# Patient Record
Sex: FEMALE | Race: ASIAN | NOT HISPANIC OR LATINO | Employment: FULL TIME | URBAN - METROPOLITAN AREA
[De-identification: names, ages, dates, MRNs, and addresses within clinical notes are randomized per-mention and may not be internally consistent; named-entity substitution may affect disease eponyms.]

---

## 2017-04-25 ENCOUNTER — OFFICE VISIT (OUTPATIENT)
Dept: OPTOMETRY | Facility: CLINIC | Age: 42
End: 2017-04-25
Payer: COMMERCIAL

## 2017-04-25 DIAGNOSIS — H43.392 VITREOUS FLOATERS OF LEFT EYE: ICD-10-CM

## 2017-04-25 DIAGNOSIS — H44.23 MYOPIC DEGENERATION, BILATERAL: Primary | ICD-10-CM

## 2017-04-25 DIAGNOSIS — H52.13 MYOPIA, BILATERAL: ICD-10-CM

## 2017-04-25 PROCEDURE — 92012 INTRM OPH EXAM EST PATIENT: CPT | Mod: S$GLB,,, | Performed by: OPTOMETRIST

## 2017-04-25 PROCEDURE — 99999 PR PBB SHADOW E&M-EST. PATIENT-LVL II: CPT | Mod: PBBFAC,,, | Performed by: OPTOMETRIST

## 2017-04-25 PROCEDURE — 92310 CONTACT LENS FITTING OU: CPT | Mod: ,,, | Performed by: OPTOMETRIST

## 2017-04-25 NOTE — PROGRESS NOTES
HPI     Needs new contacts and glasses  Left lens was uncomfortable , switched to old left , a little better  Followed in retina by Xin for PVD.        Last edited by Diogenes Baires, OD on 4/25/2017 11:11 AM.     ROS     Negative for: Constitutional, Gastrointestinal, Neurological, Skin,   Genitourinary, Musculoskeletal, HENT, Endocrine, Cardiovascular, Eyes,   Respiratory, Psychiatric, Allergic/Imm, Heme/Lymph    Last edited by Diogenes Baires, OD on 4/25/2017 10:51 AM. (History)        Assessment /Plan     For exam results, see Encounter Report.    Myopic degeneration, bilateral    Myopia, bilateral    Vitreous floaters of left eye      1,2. Spec Rx given and Contact lens Rx released to pt. Daily wear only advised, with education to risks of extended wear.  Discussed lens care, compliance and solutions. RTC yearly contact lens follow up.   . Different lens options discussed with patient. RTC 1 year full exam.  3. Keep appts with retina for follow up on floaters and retina condition.

## 2018-04-26 ENCOUNTER — OFFICE VISIT (OUTPATIENT)
Dept: OPTOMETRY | Facility: CLINIC | Age: 43
End: 2018-04-26
Payer: COMMERCIAL

## 2018-04-26 DIAGNOSIS — H44.23 MYOPIC DEGENERATION, BILATERAL: ICD-10-CM

## 2018-04-26 DIAGNOSIS — H04.123 BILATERAL DRY EYES: Primary | ICD-10-CM

## 2018-04-26 DIAGNOSIS — Z46.0 FITTING AND ADJUSTMENT OF SPECTACLES AND CONTACT LENSES: Primary | ICD-10-CM

## 2018-04-26 DIAGNOSIS — H52.13 MYOPIA, BILATERAL: ICD-10-CM

## 2018-04-26 PROCEDURE — 92012 INTRM OPH EXAM EST PATIENT: CPT | Mod: S$GLB,,, | Performed by: OPTOMETRIST

## 2018-04-26 PROCEDURE — 92310 CONTACT LENS FITTING OU: CPT | Mod: ,,, | Performed by: OPTOMETRIST

## 2018-04-26 PROCEDURE — 92015 DETERMINE REFRACTIVE STATE: CPT | Mod: S$GLB,,, | Performed by: OPTOMETRIST

## 2018-04-26 PROCEDURE — 99999 PR PBB SHADOW E&M-EST. PATIENT-LVL II: CPT | Mod: PBBFAC,,, | Performed by: OPTOMETRIST

## 2018-04-26 NOTE — PROGRESS NOTES
HPI     Increased dryness Ou  Not using drops  No new flashes or floaters    Last edited by Diogenes Baires, OD on 4/26/2018  2:23 PM. (History)            Assessment /Plan     For exam results, see Encounter Report.    Bilateral dry eyes    Myopic degeneration, bilateral    Myopia, bilateral      1. Recommend artificial tears. 1 drop 3-4x per day. Chronicity of disease and treatment discussed.  2,3. Spec Rx given and Contact lens Rx released to pt. Daily wear only advised, with education to risks of extended wear.  Discussed lens care, compliance and solutions. RTC yearly contact lens follow up.   . Different lens options discussed with patient. RTC 1 year full exam.

## 2018-06-05 ENCOUNTER — HOSPITAL ENCOUNTER (OUTPATIENT)
Dept: RADIOLOGY | Facility: OTHER | Age: 43
Discharge: HOME | End: 2018-06-05
Attending: OBSTETRICS & GYNECOLOGY
Payer: COMMERCIAL

## 2018-06-05 VITALS — HEIGHT: 60 IN | WEIGHT: 118 LBS | BODY MASS INDEX: 23.16 KG/M2

## 2018-06-05 DIAGNOSIS — Z12.31 VISIT FOR SCREENING MAMMOGRAM: ICD-10-CM

## 2018-06-05 PROCEDURE — 77063 BREAST TOMOSYNTHESIS BI: CPT | Mod: 26,,, | Performed by: RADIOLOGY

## 2018-06-05 PROCEDURE — 77067 SCR MAMMO BI INCL CAD: CPT | Mod: 26,,, | Performed by: RADIOLOGY

## 2018-06-05 PROCEDURE — 77067 SCR MAMMO BI INCL CAD: CPT | Mod: TC

## 2018-06-07 ENCOUNTER — OFFICE VISIT (OUTPATIENT)
Dept: OPHTHALMOLOGY | Facility: CLINIC | Age: 43
End: 2018-06-07
Payer: COMMERCIAL

## 2018-06-07 ENCOUNTER — HOSPITAL ENCOUNTER (OUTPATIENT)
Dept: RADIOLOGY | Facility: OTHER | Age: 43
Discharge: HOME | End: 2018-06-07
Attending: OBSTETRICS & GYNECOLOGY
Payer: COMMERCIAL

## 2018-06-07 DIAGNOSIS — H44.22: Primary | ICD-10-CM

## 2018-06-07 DIAGNOSIS — R92.8 ABNORMAL MAMMOGRAM: ICD-10-CM

## 2018-06-07 DIAGNOSIS — H35.62 RETINAL HEMORRHAGE, LEFT EYE: ICD-10-CM

## 2018-06-07 PROCEDURE — 77061 BREAST TOMOSYNTHESIS UNI: CPT | Mod: 26,LT,, | Performed by: RADIOLOGY

## 2018-06-07 PROCEDURE — 92014 COMPRE OPH EXAM EST PT 1/>: CPT | Mod: S$GLB,,, | Performed by: OPHTHALMOLOGY

## 2018-06-07 PROCEDURE — 76642 ULTRASOUND BREAST LIMITED: CPT | Mod: 26,LT,, | Performed by: RADIOLOGY

## 2018-06-07 PROCEDURE — 77065 DX MAMMO INCL CAD UNI: CPT | Mod: TC,LT

## 2018-06-07 PROCEDURE — 77065 DX MAMMO INCL CAD UNI: CPT | Mod: 26,LT,, | Performed by: RADIOLOGY

## 2018-06-07 PROCEDURE — 77061 BREAST TOMOSYNTHESIS UNI: CPT | Mod: TC,LT

## 2018-06-07 PROCEDURE — 92226 PR SPECIAL EYE EXAM, SUBSEQUENT: CPT | Mod: LT,S$GLB,, | Performed by: OPHTHALMOLOGY

## 2018-06-07 PROCEDURE — 99999 PR PBB SHADOW E&M-EST. PATIENT-LVL II: CPT | Mod: PBBFAC,,, | Performed by: OPHTHALMOLOGY

## 2018-06-07 PROCEDURE — 76642 ULTRASOUND BREAST LIMITED: CPT | Mod: TC,LT

## 2018-06-07 NOTE — PROGRESS NOTES
HPI     DLS: 3/7/16  Pt states everything seems to be good. Just updated her glasses and contacts. Only problem she still see a small grey spot out the left eye. Does not impact her vision. No flashes no floaters. No blurred   vision.     OCT from [facility] - small PPheme      A/P    1. Myopic Degeneration   S/p Avastin OS x 1 in 2011    PPheme - small, not foveal threatening      RD precautions      2 yr

## 2018-11-12 ENCOUNTER — LAB VISIT (OUTPATIENT)
Dept: LAB | Facility: HOSPITAL | Age: 43
End: 2018-11-12
Attending: INTERNAL MEDICINE
Payer: COMMERCIAL

## 2018-11-12 ENCOUNTER — OFFICE VISIT (OUTPATIENT)
Dept: INTERNAL MEDICINE | Facility: CLINIC | Age: 43
End: 2018-11-12
Payer: COMMERCIAL

## 2018-11-12 VITALS
WEIGHT: 123.88 LBS | TEMPERATURE: 98 F | DIASTOLIC BLOOD PRESSURE: 60 MMHG | BODY MASS INDEX: 24.32 KG/M2 | HEART RATE: 63 BPM | HEIGHT: 60 IN | SYSTOLIC BLOOD PRESSURE: 101 MMHG | OXYGEN SATURATION: 99 % | RESPIRATION RATE: 18 BRPM

## 2018-11-12 DIAGNOSIS — Z76.89 ENCOUNTER TO ESTABLISH CARE: ICD-10-CM

## 2018-11-12 DIAGNOSIS — Z00.00 ANNUAL PHYSICAL EXAM: Primary | ICD-10-CM

## 2018-11-12 DIAGNOSIS — D17.9 MULTIPLE LIPOMAS: ICD-10-CM

## 2018-11-12 DIAGNOSIS — Z00.00 ANNUAL PHYSICAL EXAM: ICD-10-CM

## 2018-11-12 LAB
ALBUMIN SERPL BCP-MCNC: 3.9 G/DL
ALP SERPL-CCNC: 27 U/L
ALT SERPL W/O P-5'-P-CCNC: 13 U/L
ANION GAP SERPL CALC-SCNC: 10 MMOL/L
AST SERPL-CCNC: 24 U/L
BASOPHILS # BLD AUTO: 0.03 K/UL
BASOPHILS NFR BLD: 0.6 %
BILIRUB SERPL-MCNC: 0.4 MG/DL
BUN SERPL-MCNC: 19 MG/DL
CALCIUM SERPL-MCNC: 9.6 MG/DL
CHLORIDE SERPL-SCNC: 105 MMOL/L
CHOLEST SERPL-MCNC: 194 MG/DL
CHOLEST/HDLC SERPL: 2.9 {RATIO}
CO2 SERPL-SCNC: 23 MMOL/L
CREAT SERPL-MCNC: 0.9 MG/DL
DIFFERENTIAL METHOD: ABNORMAL
EOSINOPHIL # BLD AUTO: 0.2 K/UL
EOSINOPHIL NFR BLD: 3.6 %
ERYTHROCYTE [DISTWIDTH] IN BLOOD BY AUTOMATED COUNT: 12.2 %
EST. GFR  (AFRICAN AMERICAN): >60 ML/MIN/1.73 M^2
EST. GFR  (NON AFRICAN AMERICAN): >60 ML/MIN/1.73 M^2
ESTIMATED AVG GLUCOSE: 97 MG/DL
GLUCOSE SERPL-MCNC: 99 MG/DL
HBA1C MFR BLD HPLC: 5 %
HCT VFR BLD AUTO: 39.8 %
HDLC SERPL-MCNC: 68 MG/DL
HDLC SERPL: 35.1 %
HGB BLD-MCNC: 13.6 G/DL
IMM GRANULOCYTES # BLD AUTO: 0.02 K/UL
IMM GRANULOCYTES NFR BLD AUTO: 0.4 %
LDLC SERPL CALC-MCNC: 77.4 MG/DL
LYMPHOCYTES # BLD AUTO: 1.9 K/UL
LYMPHOCYTES NFR BLD: 36.1 %
MCH RBC QN AUTO: 32.4 PG
MCHC RBC AUTO-ENTMCNC: 34.2 G/DL
MCV RBC AUTO: 95 FL
MONOCYTES # BLD AUTO: 0.3 K/UL
MONOCYTES NFR BLD: 5.2 %
NEUTROPHILS # BLD AUTO: 2.9 K/UL
NEUTROPHILS NFR BLD: 54.1 %
NONHDLC SERPL-MCNC: 126 MG/DL
NRBC BLD-RTO: 0 /100 WBC
PLATELET # BLD AUTO: 253 K/UL
PMV BLD AUTO: 9.6 FL
POTASSIUM SERPL-SCNC: 3.6 MMOL/L
PROT SERPL-MCNC: 7.7 G/DL
RBC # BLD AUTO: 4.2 M/UL
SODIUM SERPL-SCNC: 138 MMOL/L
TRIGL SERPL-MCNC: 243 MG/DL
TSH SERPL DL<=0.005 MIU/L-ACNC: 1.63 UIU/ML
WBC # BLD AUTO: 5.35 K/UL

## 2018-11-12 PROCEDURE — 99386 PREV VISIT NEW AGE 40-64: CPT | Mod: S$GLB,,, | Performed by: INTERNAL MEDICINE

## 2018-11-12 PROCEDURE — 80053 COMPREHEN METABOLIC PANEL: CPT

## 2018-11-12 PROCEDURE — 99999 PR PBB SHADOW E&M-EST. PATIENT-LVL III: CPT | Mod: PBBFAC,,, | Performed by: INTERNAL MEDICINE

## 2018-11-12 PROCEDURE — 36415 COLL VENOUS BLD VENIPUNCTURE: CPT | Mod: PO

## 2018-11-12 PROCEDURE — 83036 HEMOGLOBIN GLYCOSYLATED A1C: CPT

## 2018-11-12 PROCEDURE — 80061 LIPID PANEL: CPT

## 2018-11-12 PROCEDURE — 84443 ASSAY THYROID STIM HORMONE: CPT

## 2018-11-12 PROCEDURE — 85025 COMPLETE CBC W/AUTO DIFF WBC: CPT

## 2018-11-12 RX ORDER — SULFAMETHOXAZOLE AND TRIMETHOPRIM 800; 160 MG/1; MG/1
TABLET ORAL
COMMUNITY
Start: 2018-11-09 | End: 2021-07-27

## 2018-11-12 RX ORDER — DESOGESTREL AND ETHINYL ESTRADIOL 0.15-0.03
KIT ORAL
COMMUNITY
Start: 2018-09-25 | End: 2021-07-27

## 2018-11-12 NOTE — PROGRESS NOTES
Subjective:       Patient ID: Jennifer Rivera is a 43 y.o. female.    Chief Complaint: Annual Exam    HPI    43 y.o. female here for annual exam.     Health Maintenance:   Lipid disorders/ASCVD risk: due    DM: A1c due  Eye exam: utd- Dr. Baires  Breast Cancer (40-74y): Mammogram 18   Cervical Cancer (21-65y):  05/15/2018-normal. Gyn Dr. Coty Markham      Vaccines:   Influenza (yearly) 10/16/18   Tetanus (every 10 yrs - 1st tdap) ~7 years ago - tdap w/ pregnancy           Medical Problems:  1. lipoma: no pain or rapid growth, she just wanted a second opinion.        Past Medical History:   Diagnosis Date    Retina disorder     h/o ret disorder OD. S/p Avastin     Past Surgical History:   Procedure Laterality Date     SECTION      x 2    eye injection      s/p Avastin     Family History   Problem Relation Age of Onset    Osteoporosis Mother         no hist of hip fracture    Hypertension Father     Hyperlipidemia Father     Gallbladder disease Father     Gout Father     No Known Problems Brother     Glaucoma Neg Hx     Amblyopia Neg Hx     Blindness Neg Hx     Cancer Neg Hx     Cataracts Neg Hx     Diabetes Neg Hx     Macular degeneration Neg Hx     Retinal detachment Neg Hx     Strabismus Neg Hx     Stroke Neg Hx     Thyroid disease Neg Hx      Social History     Socioeconomic History    Marital status:      Spouse name: Not on file    Number of children: Not on file    Years of education: Not on file    Highest education level: Not on file   Social Needs    Financial resource strain: Not on file    Food insecurity - worry: Not on file    Food insecurity - inability: Not on file    Transportation needs - medical: Not on file    Transportation needs - non-medical: Not on file   Occupational History    Not on file   Tobacco Use    Smoking status: Never Smoker   Substance and Sexual Activity    Alcohol use: No    Drug use: Not on file    Sexual activity: Not on file    Other Topics Concern    Not on file   Social History Narrative    Not on file     Review of patient's allergies indicates:  No Known Allergies    Current Outpatient Medications:     ENSKYCE 0.15-0.03 mg per tablet, , Disp: , Rfl:     sulfamethoxazole-trimethoprim 800-160mg (BACTRIM DS) 800-160 mg Tab, , Disp: , Rfl:       Review of Systems   Constitutional: Negative for activity change and unexpected weight change.   HENT: Negative for hearing loss, rhinorrhea and trouble swallowing.    Eyes: Negative for discharge and visual disturbance.   Respiratory: Negative for chest tightness and wheezing.    Cardiovascular: Negative for chest pain and palpitations.   Gastrointestinal: Negative for blood in stool, constipation, diarrhea and vomiting.   Endocrine: Negative for polydipsia and polyuria.   Genitourinary: Negative for difficulty urinating, dysuria, hematuria and menstrual problem.   Musculoskeletal: Negative for arthralgias, joint swelling and neck pain.   Neurological: Negative for weakness and headaches.   Psychiatric/Behavioral: Negative for confusion and dysphoric mood.       Objective:        Vitals:    11/12/18 1252   BP: 101/60   Pulse: 63   Resp: 18   Temp: 97.6 °F (36.4 °C)   SpO2: 99%   Weight: 56.2 kg (123 lb 14.4 oz)   Height: 5' (1.524 m)       Body mass index is 24.2 kg/m².    Physical Exam   Constitutional: She is oriented to person, place, and time. She appears well-developed. No distress.   HENT:   Head: Normocephalic and atraumatic.   Right Ear: Tympanic membrane normal.   Left Ear: Tympanic membrane normal.   Nose: Nose normal.   Mouth/Throat: Oropharynx is clear and moist.   Eyes: Conjunctivae and EOM are normal.   Neck: Normal range of motion. Neck supple. No tracheal deviation present. No thyromegaly present.   Cardiovascular: Normal rate, regular rhythm, normal heart sounds and intact distal pulses.   Pulmonary/Chest: Effort normal and breath sounds normal.   Abdominal: Soft. Bowel  sounds are normal. She exhibits no distension and no mass. There is no tenderness.   Musculoskeletal: Normal range of motion. She exhibits no edema.   Lymphadenopathy:     She has no cervical adenopathy.   Neurological: She is alert and oriented to person, place, and time. No sensory deficit.   Skin: Skin is warm and dry. She is not diaphoretic. No cyanosis. Nails show no clubbing.   + several lipomas   Psychiatric: She has a normal mood and affect. Her behavior is normal. Judgment normal.       Assessment:     1. Annual physical exam    2. Encounter to establish care    3. Multiple lipomas           Plan:         1. Annual physical exam  - flu shot utd  - records requested from her OBGYN for pap smear and tdap dates  - CBC auto differential; Future  - Comprehensive metabolic panel; Future  - Hemoglobin A1c; Future  - Lipid panel; Future  - TSH; Future    2. Encounter to establish care  - reviewed healthcare maintenance and pt's chronic medical problems.     3. Multiple lipomas  - she reports stability in size. Continue to monitor.            Patient note was created using MModal Dictation.  Any errors in syntax or even information may not have been identified and edited on initial review prior to signing this note.

## 2018-11-13 ENCOUNTER — PATIENT MESSAGE (OUTPATIENT)
Dept: INTERNAL MEDICINE | Facility: CLINIC | Age: 43
End: 2018-11-13

## 2018-11-28 ENCOUNTER — TELEPHONE (OUTPATIENT)
Dept: ADMINISTRATIVE | Facility: HOSPITAL | Age: 43
End: 2018-11-28

## 2019-05-28 DIAGNOSIS — Z12.39 SCREENING BREAST EXAMINATION: Primary | ICD-10-CM

## 2019-06-10 ENCOUNTER — HOSPITAL ENCOUNTER (OUTPATIENT)
Dept: RADIOLOGY | Facility: OTHER | Age: 44
Discharge: HOME | End: 2019-06-10
Attending: OBSTETRICS & GYNECOLOGY
Payer: COMMERCIAL

## 2019-06-10 DIAGNOSIS — Z12.39 SCREENING BREAST EXAMINATION: ICD-10-CM

## 2019-06-10 PROCEDURE — 77067 SCR MAMMO BI INCL CAD: CPT | Mod: TC

## 2019-06-10 PROCEDURE — 77063 MAMMO DIGITAL SCREENING BILAT WITH TOMOSYNTHESIS_CAD: ICD-10-PCS | Mod: 26,,, | Performed by: RADIOLOGY

## 2019-06-10 PROCEDURE — 77067 MAMMO DIGITAL SCREENING BILAT WITH TOMOSYNTHESIS_CAD: ICD-10-PCS | Mod: 26,,, | Performed by: RADIOLOGY

## 2019-06-10 PROCEDURE — 77067 SCR MAMMO BI INCL CAD: CPT | Mod: 26,,, | Performed by: RADIOLOGY

## 2019-06-10 PROCEDURE — 77063 BREAST TOMOSYNTHESIS BI: CPT | Mod: 26,,, | Performed by: RADIOLOGY

## 2019-08-08 ENCOUNTER — PATIENT MESSAGE (OUTPATIENT)
Dept: OPTOMETRY | Facility: CLINIC | Age: 44
End: 2019-08-08

## 2019-08-20 ENCOUNTER — OFFICE VISIT (OUTPATIENT)
Dept: OPTOMETRY | Facility: CLINIC | Age: 44
End: 2019-08-20
Payer: COMMERCIAL

## 2019-08-20 DIAGNOSIS — Z46.0 FITTING AND ADJUSTMENT OF SPECTACLES AND CONTACT LENSES: Primary | ICD-10-CM

## 2019-08-20 DIAGNOSIS — H52.13 MYOPIA, BILATERAL: ICD-10-CM

## 2019-08-20 DIAGNOSIS — H44.23 MYOPIC DEGENERATION, BILATERAL: Primary | ICD-10-CM

## 2019-08-20 PROCEDURE — 92310 PR CONTACT LENS FITTING (NO CHANGE): ICD-10-PCS | Mod: CSM,,, | Performed by: OPTOMETRIST

## 2019-08-20 PROCEDURE — 99999 PR PBB SHADOW E&M-EST. PATIENT-LVL II: CPT | Mod: PBBFAC,,, | Performed by: OPTOMETRIST

## 2019-08-20 PROCEDURE — 92310 CONTACT LENS FITTING OU: CPT | Mod: CSM,,, | Performed by: OPTOMETRIST

## 2019-08-20 PROCEDURE — 99999 PR PBB SHADOW E&M-EST. PATIENT-LVL II: ICD-10-PCS | Mod: PBBFAC,,, | Performed by: OPTOMETRIST

## 2019-08-20 PROCEDURE — 92012 PR EYE EXAM, EST PATIENT,INTERMED: ICD-10-PCS | Mod: S$GLB,,, | Performed by: OPTOMETRIST

## 2019-08-20 PROCEDURE — 92012 INTRM OPH EXAM EST PATIENT: CPT | Mod: S$GLB,,, | Performed by: OPTOMETRIST

## 2019-08-20 RX ORDER — TACROLIMUS 1 MG/G
OINTMENT TOPICAL
COMMUNITY
Start: 2019-06-12 | End: 2021-07-27

## 2019-08-20 NOTE — PROGRESS NOTES
TAMARA     IDALIA: 6/7/18 with Dr. Danielson  Patient is here for an annual eye exam. She is wearing her most recent CL   rx. She feel her left eye is weaker than the right and vision is a little   blurred in OS.  No flashes  Occasional floaters  No diplopia  Gtts: AT's PRN - 2x a month  No eye surgeries      Last edited by Allison Mcleod on 8/20/2019  8:50 AM. (History)            Assessment /Plan     For exam results, see Encounter Report.    Myopic degeneration, bilateral    Myopia, bilateral      1. No new symptoms, acuity stable, Jagjit pt, RTC for eval same day if any new symptoms.   2. Contact lens Rx released to pt. Daily wear only advised, with education to risks of extended wear.  Discussed lens care, compliance and solutions. RTC yearly contact lens follow up.

## 2019-09-13 ENCOUNTER — IMMUNIZATION (OUTPATIENT)
Dept: URGENT CARE | Facility: CLINIC | Age: 44
End: 2019-09-13
Payer: COMMERCIAL

## 2019-09-13 VITALS — TEMPERATURE: 98 F

## 2019-09-13 DIAGNOSIS — Z23 IMMUNIZATION DUE: Primary | ICD-10-CM

## 2019-09-13 PROCEDURE — 90471 PR IMMUNIZ ADMIN,1 SINGLE/COMB VAC/TOXOID: ICD-10-PCS | Mod: S$GLB,,, | Performed by: FAMILY MEDICINE

## 2019-09-13 PROCEDURE — 90658 IIV3 VACCINE SPLT 0.5 ML IM: CPT | Mod: S$GLB,,, | Performed by: FAMILY MEDICINE

## 2019-09-13 PROCEDURE — 90658 PR FLU VACCINE, >3 YRS, IM: ICD-10-PCS | Mod: S$GLB,,, | Performed by: FAMILY MEDICINE

## 2019-09-13 PROCEDURE — 90471 IMMUNIZATION ADMIN: CPT | Mod: S$GLB,,, | Performed by: FAMILY MEDICINE

## 2019-12-12 ENCOUNTER — HOSPITAL ENCOUNTER (OUTPATIENT)
Dept: RADIOLOGY | Facility: OTHER | Age: 44
Discharge: HOME | End: 2019-12-12
Attending: OBSTETRICS & GYNECOLOGY
Payer: COMMERCIAL

## 2019-12-12 DIAGNOSIS — Z12.39 SCREENING BREAST EXAMINATION: ICD-10-CM

## 2019-12-12 PROCEDURE — 77061 BREAST TOMOSYNTHESIS UNI: CPT | Mod: TC,LT

## 2019-12-12 PROCEDURE — 77061 BREAST TOMOSYNTHESIS UNI: CPT | Mod: 26,LT,, | Performed by: RADIOLOGY

## 2019-12-12 PROCEDURE — 77065 DX MAMMO INCL CAD UNI: CPT | Mod: 26,LT,, | Performed by: RADIOLOGY

## 2019-12-12 PROCEDURE — 77061 MAMMO DIGITAL DIAGNOSTIC LEFT WITH TOMOSYNTHESIS_CAD: ICD-10-PCS | Mod: 26,LT,, | Performed by: RADIOLOGY

## 2019-12-12 PROCEDURE — 77065 DX MAMMO INCL CAD UNI: CPT | Mod: TC,LT

## 2019-12-12 PROCEDURE — 77065 MAMMO DIGITAL DIAGNOSTIC LEFT WITH TOMOSYNTHESIS_CAD: ICD-10-PCS | Mod: 26,LT,, | Performed by: RADIOLOGY

## 2020-02-27 ENCOUNTER — OFFICE VISIT (OUTPATIENT)
Dept: OPHTHALMOLOGY | Facility: CLINIC | Age: 45
End: 2020-02-27
Payer: COMMERCIAL

## 2020-02-27 DIAGNOSIS — H43.392 VITREOUS FLOATERS OF LEFT EYE: ICD-10-CM

## 2020-02-27 DIAGNOSIS — H35.62 RETINAL HEMORRHAGE, LEFT EYE: Primary | ICD-10-CM

## 2020-02-27 PROCEDURE — 92014 PR EYE EXAM, EST PATIENT,COMPREHESV: ICD-10-PCS | Mod: S$GLB,,, | Performed by: OPHTHALMOLOGY

## 2020-02-27 PROCEDURE — 92201 PR OPHTHALMOSCOPY, EXT, W/RET DRAW/SCLERAL DEPR, I&R, UNI/BI: ICD-10-PCS | Mod: S$GLB,,, | Performed by: OPHTHALMOLOGY

## 2020-02-27 PROCEDURE — 92014 COMPRE OPH EXAM EST PT 1/>: CPT | Mod: S$GLB,,, | Performed by: OPHTHALMOLOGY

## 2020-02-27 PROCEDURE — 92134 CPTRZ OPH DX IMG PST SGM RTA: CPT | Mod: S$GLB,,, | Performed by: OPHTHALMOLOGY

## 2020-02-27 PROCEDURE — 99999 PR PBB SHADOW E&M-EST. PATIENT-LVL III: CPT | Mod: PBBFAC,,, | Performed by: OPHTHALMOLOGY

## 2020-02-27 PROCEDURE — 99999 PR PBB SHADOW E&M-EST. PATIENT-LVL III: ICD-10-PCS | Mod: PBBFAC,,, | Performed by: OPHTHALMOLOGY

## 2020-02-27 PROCEDURE — 92201 OPSCPY EXTND RTA DRAW UNI/BI: CPT | Mod: S$GLB,,, | Performed by: OPHTHALMOLOGY

## 2020-02-27 PROCEDURE — 92134 POSTERIOR SEGMENT OCT RETINA (OCULAR COHERENCE TOMOGRAPHY)-BOTH EYES: ICD-10-PCS | Mod: S$GLB,,, | Performed by: OPHTHALMOLOGY

## 2020-02-27 RX ORDER — DESOGESTREL AND ETHINYL ESTRADIOL 0.15-0.03
KIT ORAL
COMMUNITY
End: 2021-07-27

## 2020-02-27 NOTE — PROGRESS NOTES
HPI     2 yr f/u   DLS-06/07/2018 Dr. Danielson    Pt sts seeing a flutter/flickering in OS only getting worse 2 weeks ago   after falling down the stairs and it is impacting vision depending on how   she looks. Vision seems weaker causing central vision to be blurred but   peripheral vision seems to be worse in OS. Flickering is temporally upper   left side but is slightly wider than before.    Occasional discomfort with CLS wears RGP lenses     (-)Flashes (+)Floaters stable no new floaters   (-)Photophobia  (-)Glare     No gtts       OCT - No ME or heme      A/P    1. Myopic Degeneration   S/p Avastin OS x 1 in 2011    PPheme - small, not foveal threatening      RD precautions      2 yr

## 2020-03-10 ENCOUNTER — OFFICE VISIT (OUTPATIENT)
Dept: OPTOMETRY | Facility: CLINIC | Age: 45
End: 2020-03-10
Payer: COMMERCIAL

## 2020-03-10 DIAGNOSIS — H04.123 BILATERAL DRY EYES: Primary | ICD-10-CM

## 2020-03-10 DIAGNOSIS — Z46.0 FITTING AND ADJUSTMENT OF SPECTACLES AND CONTACT LENSES: Primary | ICD-10-CM

## 2020-03-10 DIAGNOSIS — H52.13 MYOPIA, BILATERAL: ICD-10-CM

## 2020-03-10 PROCEDURE — 99999 PR PBB SHADOW E&M-EST. PATIENT-LVL II: CPT | Mod: PBBFAC,,, | Performed by: OPTOMETRIST

## 2020-03-10 PROCEDURE — 92012 PR EYE EXAM, EST PATIENT,INTERMED: ICD-10-PCS | Mod: S$GLB,,, | Performed by: OPTOMETRIST

## 2020-03-10 PROCEDURE — 92310 CONTACT LENS FITTING OU: CPT | Mod: CSM,,, | Performed by: OPTOMETRIST

## 2020-03-10 PROCEDURE — 99999 PR PBB SHADOW E&M-EST. PATIENT-LVL II: ICD-10-PCS | Mod: PBBFAC,,, | Performed by: OPTOMETRIST

## 2020-03-10 PROCEDURE — 92310 PR CONTACT LENS FITTING (NO CHANGE): ICD-10-PCS | Mod: CSM,,, | Performed by: OPTOMETRIST

## 2020-03-10 PROCEDURE — 92012 INTRM OPH EXAM EST PATIENT: CPT | Mod: S$GLB,,, | Performed by: OPTOMETRIST

## 2020-03-10 PROCEDURE — 92015 DETERMINE REFRACTIVE STATE: CPT | Mod: S$GLB,,, | Performed by: OPTOMETRIST

## 2020-03-10 PROCEDURE — 92015 PR REFRACTION: ICD-10-PCS | Mod: S$GLB,,, | Performed by: OPTOMETRIST

## 2020-03-10 NOTE — PROGRESS NOTES
TAMARA FRIEDMAN 028/20 with Dr. Danielson.  Patient has floater OS that sometimes gets   in the way of her vision. OS contact is uncomfortable sometimes, feels   like it's not in the right place. Patient thinks OS is not as clear but   may be due to floater.    Distance and near seems fine.  Glasses about 3   yrs. Ol.d and may be a little weak but mainly wears at night after   removing contacts.    Last edited by Edyta Graham on 3/10/2020  9:48 AM. (History)            Assessment /Plan     For exam results, see Encounter Report.    Bilateral dry eyes    Myopia, bilateral      1. Cont with systane drops as needed. No evidence of spk.  2. New Spec Rx given and Contact lens Rx released to pt. Daily wear only advised, with education to risks of extended wear.  Discussed lens care, compliance and solutions. RTC yearly contact lens follow up.   . Different lens options discussed with patient. RTC 1 year full exam.

## 2020-04-06 ENCOUNTER — PATIENT MESSAGE (OUTPATIENT)
Dept: OPTOMETRY | Facility: CLINIC | Age: 45
End: 2020-04-06

## 2020-10-05 ENCOUNTER — PATIENT MESSAGE (OUTPATIENT)
Dept: ADMINISTRATIVE | Facility: HOSPITAL | Age: 45
End: 2020-10-05

## 2021-01-04 ENCOUNTER — PATIENT MESSAGE (OUTPATIENT)
Dept: ADMINISTRATIVE | Facility: HOSPITAL | Age: 46
End: 2021-01-04

## 2021-04-05 ENCOUNTER — PATIENT MESSAGE (OUTPATIENT)
Dept: ADMINISTRATIVE | Facility: HOSPITAL | Age: 46
End: 2021-04-05

## 2021-04-16 ENCOUNTER — PATIENT MESSAGE (OUTPATIENT)
Dept: RESEARCH | Facility: HOSPITAL | Age: 46
End: 2021-04-16

## 2021-05-24 ENCOUNTER — OFFICE VISIT (OUTPATIENT)
Dept: DERMATOLOGY | Facility: CLINIC | Age: 46
End: 2021-05-24
Payer: COMMERCIAL

## 2021-05-24 DIAGNOSIS — L81.4 LENTIGO: ICD-10-CM

## 2021-05-24 DIAGNOSIS — D22.9 NEVUS: ICD-10-CM

## 2021-05-24 DIAGNOSIS — L70.0 ACNE VULGARIS: ICD-10-CM

## 2021-05-24 DIAGNOSIS — D18.01 CHERRY ANGIOMA: ICD-10-CM

## 2021-05-24 DIAGNOSIS — L30.9 ECZEMA, UNSPECIFIED TYPE: Primary | ICD-10-CM

## 2021-05-24 PROCEDURE — 99999 PR PBB SHADOW E&M-EST. PATIENT-LVL II: CPT | Mod: PBBFAC,,, | Performed by: DERMATOLOGY

## 2021-05-24 PROCEDURE — 99203 OFFICE O/P NEW LOW 30 MIN: CPT | Mod: S$GLB,,, | Performed by: DERMATOLOGY

## 2021-05-24 PROCEDURE — 99999 PR PBB SHADOW E&M-EST. PATIENT-LVL II: ICD-10-PCS | Mod: PBBFAC,,, | Performed by: DERMATOLOGY

## 2021-05-24 PROCEDURE — 99203 PR OFFICE/OUTPT VISIT, NEW, LEVL III, 30-44 MIN: ICD-10-PCS | Mod: S$GLB,,, | Performed by: DERMATOLOGY

## 2021-05-24 RX ORDER — TRIAMCINOLONE ACETONIDE 1 MG/G
CREAM TOPICAL
Qty: 60 G | Refills: 1 | Status: SHIPPED | OUTPATIENT
Start: 2021-05-24

## 2021-05-24 RX ORDER — ALCLOMETASONE DIPROPIONATE 0.5 MG/G
OINTMENT TOPICAL
Qty: 30 G | Refills: 1 | Status: SHIPPED | OUTPATIENT
Start: 2021-05-24

## 2021-05-24 RX ORDER — MINOCYCLINE 40 MG/G
AEROSOL, FOAM TOPICAL
Qty: 30 G | Refills: 3 | Status: SHIPPED | OUTPATIENT
Start: 2021-05-24

## 2021-06-28 ENCOUNTER — HOSPITAL ENCOUNTER (OUTPATIENT)
Dept: RADIOLOGY | Facility: HOSPITAL | Age: 46
Discharge: HOME | End: 2021-06-28
Attending: OBSTETRICS & GYNECOLOGY
Payer: COMMERCIAL

## 2021-06-28 VITALS — WEIGHT: 118 LBS | BODY MASS INDEX: 23.05 KG/M2

## 2021-06-28 DIAGNOSIS — Z12.31 VISIT FOR SCREENING MAMMOGRAM: ICD-10-CM

## 2021-06-28 PROCEDURE — 77067 MAMMO DIGITAL SCREENING BILAT WITH TOMO: ICD-10-PCS | Mod: 26,,, | Performed by: RADIOLOGY

## 2021-06-28 PROCEDURE — 77063 BREAST TOMOSYNTHESIS BI: CPT | Mod: 26,,, | Performed by: RADIOLOGY

## 2021-06-28 PROCEDURE — 77067 SCR MAMMO BI INCL CAD: CPT | Mod: TC,PN

## 2021-06-28 PROCEDURE — 77063 MAMMO DIGITAL SCREENING BILAT WITH TOMO: ICD-10-PCS | Mod: 26,,, | Performed by: RADIOLOGY

## 2021-06-28 PROCEDURE — 77067 SCR MAMMO BI INCL CAD: CPT | Mod: 26,,, | Performed by: RADIOLOGY

## 2021-07-06 ENCOUNTER — TELEPHONE (OUTPATIENT)
Dept: INTERNAL MEDICINE | Facility: CLINIC | Age: 46
End: 2021-07-06

## 2021-07-06 ENCOUNTER — PATIENT MESSAGE (OUTPATIENT)
Dept: ADMINISTRATIVE | Facility: HOSPITAL | Age: 46
End: 2021-07-06

## 2021-07-06 DIAGNOSIS — Z00.00 ANNUAL PHYSICAL EXAM: Primary | ICD-10-CM

## 2021-07-27 ENCOUNTER — OFFICE VISIT (OUTPATIENT)
Dept: OPTOMETRY | Facility: CLINIC | Age: 46
End: 2021-07-27
Payer: COMMERCIAL

## 2021-07-27 DIAGNOSIS — Z97.3 WEARS CONTACT LENSES: ICD-10-CM

## 2021-07-27 DIAGNOSIS — H44.23 UNCOMPLICATED DEGENERATIVE MYOPIA OF BOTH EYES: Primary | ICD-10-CM

## 2021-07-27 DIAGNOSIS — Z46.0 FITTING AND ADJUSTMENT OF SPECTACLES AND CONTACT LENSES: Primary | ICD-10-CM

## 2021-07-27 DIAGNOSIS — H52.13 MYOPIA, BILATERAL: ICD-10-CM

## 2021-07-27 PROCEDURE — 92012 INTRM OPH EXAM EST PATIENT: CPT | Mod: S$GLB,,, | Performed by: OPTOMETRIST

## 2021-07-27 PROCEDURE — 99999 PR PBB SHADOW E&M-EST. PATIENT-LVL II: CPT | Mod: PBBFAC,,, | Performed by: OPTOMETRIST

## 2021-07-27 PROCEDURE — 92310 CONTACT LENS FITTING OU: CPT | Mod: CSM,,, | Performed by: OPTOMETRIST

## 2021-07-27 PROCEDURE — 99999 PR PBB SHADOW E&M-EST. PATIENT-LVL II: ICD-10-PCS | Mod: PBBFAC,,, | Performed by: OPTOMETRIST

## 2021-07-27 PROCEDURE — 92012 PR EYE EXAM, EST PATIENT,INTERMED: ICD-10-PCS | Mod: S$GLB,,, | Performed by: OPTOMETRIST

## 2021-07-27 PROCEDURE — 92310 PR CONTACT LENS FITTING (NO CHANGE): ICD-10-PCS | Mod: CSM,,, | Performed by: OPTOMETRIST

## 2021-07-28 ENCOUNTER — CLINICAL SUPPORT (OUTPATIENT)
Dept: URGENT CARE | Facility: CLINIC | Age: 46
End: 2021-07-28
Payer: COMMERCIAL

## 2021-07-28 DIAGNOSIS — Z11.59 ENCOUNTER FOR SCREENING FOR OTHER VIRAL DISEASES: Primary | ICD-10-CM

## 2021-07-28 LAB
CTP QC/QA: YES
SARS-COV-2 RDRP RESP QL NAA+PROBE: NEGATIVE

## 2021-07-28 PROCEDURE — U0002 COVID-19 LAB TEST NON-CDC: HCPCS | Mod: QW,S$GLB,, | Performed by: FAMILY MEDICINE

## 2021-07-28 PROCEDURE — U0002: ICD-10-PCS | Mod: QW,S$GLB,, | Performed by: FAMILY MEDICINE

## 2021-10-04 ENCOUNTER — PATIENT MESSAGE (OUTPATIENT)
Dept: ADMINISTRATIVE | Facility: HOSPITAL | Age: 46
End: 2021-10-04

## 2021-10-05 ENCOUNTER — CLINICAL SUPPORT (OUTPATIENT)
Dept: URGENT CARE | Facility: CLINIC | Age: 46
End: 2021-10-05
Payer: COMMERCIAL

## 2021-10-05 DIAGNOSIS — Z23 FLU VACCINE NEED: Primary | ICD-10-CM

## 2021-10-05 PROCEDURE — 90471 FLU VACCINE (QUAD) GREATER THAN OR EQUAL TO 3YO PRESERVATIVE FREE IM: ICD-10-PCS | Mod: S$GLB,,, | Performed by: PHYSICIAN ASSISTANT

## 2021-10-05 PROCEDURE — 90686 FLU VACCINE (QUAD) GREATER THAN OR EQUAL TO 3YO PRESERVATIVE FREE IM: ICD-10-PCS | Mod: S$GLB,,, | Performed by: PHYSICIAN ASSISTANT

## 2021-10-05 PROCEDURE — 90686 IIV4 VACC NO PRSV 0.5 ML IM: CPT | Mod: S$GLB,,, | Performed by: PHYSICIAN ASSISTANT

## 2021-10-05 PROCEDURE — 90471 IMMUNIZATION ADMIN: CPT | Mod: S$GLB,,, | Performed by: PHYSICIAN ASSISTANT

## 2022-01-30 ENCOUNTER — PATIENT MESSAGE (OUTPATIENT)
Dept: OPTOMETRY | Facility: CLINIC | Age: 47
End: 2022-01-30
Payer: COMMERCIAL

## 2022-08-16 ENCOUNTER — HOSPITAL ENCOUNTER (OUTPATIENT)
Dept: RADIOLOGY | Facility: OTHER | Age: 47
Discharge: HOME | End: 2022-08-16
Attending: OBSTETRICS & GYNECOLOGY
Payer: COMMERCIAL

## 2022-08-16 DIAGNOSIS — Z12.31 BREAST CANCER SCREENING BY MAMMOGRAM: ICD-10-CM

## 2022-08-16 PROCEDURE — 77067 SCR MAMMO BI INCL CAD: CPT | Mod: 26,,, | Performed by: RADIOLOGY

## 2022-08-16 PROCEDURE — 77063 MAMMO DIGITAL SCREENING BILAT WITH TOMO: ICD-10-PCS | Mod: 26,,, | Performed by: RADIOLOGY

## 2022-08-16 PROCEDURE — 77063 BREAST TOMOSYNTHESIS BI: CPT | Mod: TC

## 2022-08-16 PROCEDURE — 77063 BREAST TOMOSYNTHESIS BI: CPT | Mod: 26,,, | Performed by: RADIOLOGY

## 2022-08-16 PROCEDURE — 77067 MAMMO DIGITAL SCREENING BILAT WITH TOMO: ICD-10-PCS | Mod: 26,,, | Performed by: RADIOLOGY

## 2022-08-16 PROCEDURE — 77067 SCR MAMMO BI INCL CAD: CPT | Mod: TC

## 2022-10-04 ENCOUNTER — OFFICE VISIT (OUTPATIENT)
Dept: OPTOMETRY | Facility: CLINIC | Age: 47
End: 2022-10-04
Payer: COMMERCIAL

## 2022-10-04 DIAGNOSIS — H52.13 MYOPIA, BILATERAL: Primary | ICD-10-CM

## 2022-10-04 PROCEDURE — 99499 UNLISTED E&M SERVICE: CPT | Mod: S$GLB,,, | Performed by: OPTOMETRIST

## 2022-10-04 PROCEDURE — 92310 CONTACT LENS FITTING OU: CPT | Mod: CSM,S$GLB,, | Performed by: OPTOMETRIST

## 2022-10-04 PROCEDURE — 99499 NO LOS: ICD-10-PCS | Mod: S$GLB,,, | Performed by: OPTOMETRIST

## 2022-10-04 PROCEDURE — 99999 PR PBB SHADOW E&M-EST. PATIENT-LVL II: CPT | Mod: PBBFAC,,, | Performed by: OPTOMETRIST

## 2022-10-04 PROCEDURE — 92310 PR CONTACT LENS FITTING (NO CHANGE): ICD-10-PCS | Mod: CSM,S$GLB,, | Performed by: OPTOMETRIST

## 2022-10-04 PROCEDURE — 99999 PR PBB SHADOW E&M-EST. PATIENT-LVL II: ICD-10-PCS | Mod: PBBFAC,,, | Performed by: OPTOMETRIST

## 2022-10-04 NOTE — PROGRESS NOTES
HPI     Blurred Vision     Additional comments: Blur ou at dist, x mos, no assoc pain or red, no   relief over time, constant            Comments    48 Y/o female is here for routine eye exam with C/o needs an updated Rx   for contact lens states when when reading far away she cannot read letters     Pt denies pain and discomfort  No f/f    Eye med: Systane OU PRN           Last edited by Diogenes Baires, OD on 10/4/2022 11:09 AM.            Assessment /Plan     For exam results, see Encounter Report.    Myopia, bilateral      Contact lens Rx released to pt. Daily wear only advised, with education to risks of extended wear.  Discussed lens care, compliance and solutions. RTC yearly contact lens follow up.

## 2022-10-10 ENCOUNTER — PATIENT MESSAGE (OUTPATIENT)
Dept: OPTOMETRY | Facility: CLINIC | Age: 47
End: 2022-10-10
Payer: COMMERCIAL

## 2022-10-19 ENCOUNTER — PATIENT MESSAGE (OUTPATIENT)
Dept: OPTOMETRY | Facility: CLINIC | Age: 47
End: 2022-10-19
Payer: COMMERCIAL

## 2022-11-24 ENCOUNTER — PATIENT MESSAGE (OUTPATIENT)
Dept: OPTOMETRY | Facility: CLINIC | Age: 47
End: 2022-11-24
Payer: COMMERCIAL

## 2022-12-13 ENCOUNTER — TELEPHONE (OUTPATIENT)
Dept: OPTOMETRY | Facility: CLINIC | Age: 47
End: 2022-12-13
Payer: COMMERCIAL

## 2022-12-14 ENCOUNTER — TELEPHONE (OUTPATIENT)
Dept: OPTOMETRY | Facility: CLINIC | Age: 47
End: 2022-12-14
Payer: COMMERCIAL

## 2022-12-22 ENCOUNTER — PATIENT MESSAGE (OUTPATIENT)
Dept: OPTOMETRY | Facility: CLINIC | Age: 47
End: 2022-12-22
Payer: COMMERCIAL

## 2023-03-28 ENCOUNTER — PATIENT MESSAGE (OUTPATIENT)
Dept: OPTOMETRY | Facility: CLINIC | Age: 48
End: 2023-03-28
Payer: COMMERCIAL

## 2023-04-04 ENCOUNTER — OFFICE VISIT (OUTPATIENT)
Dept: OPTOMETRY | Facility: CLINIC | Age: 48
End: 2023-04-04
Payer: COMMERCIAL

## 2023-04-04 DIAGNOSIS — H52.13 MYOPIA, BILATERAL: Primary | ICD-10-CM

## 2023-04-04 PROCEDURE — 99499 UNLISTED E&M SERVICE: CPT | Mod: S$GLB,,, | Performed by: OPTOMETRIST

## 2023-04-04 PROCEDURE — 99999 PR PBB SHADOW E&M-EST. PATIENT-LVL II: CPT | Mod: PBBFAC,,, | Performed by: OPTOMETRIST

## 2023-04-04 PROCEDURE — 99499 NO LOS: ICD-10-PCS | Mod: S$GLB,,, | Performed by: OPTOMETRIST

## 2023-04-04 PROCEDURE — 99999 PR PBB SHADOW E&M-EST. PATIENT-LVL II: ICD-10-PCS | Mod: PBBFAC,,, | Performed by: OPTOMETRIST

## 2023-04-04 NOTE — PROGRESS NOTES
HPI    48 Y/o female is here for routine eye exam with C/o pt states she has an   old contact lens in OD and it is uncomfortable and dry's her eye's out ,   she can see out of them fine, and new contacts are little blurry for   distance   Pt denies pain and discomfort   No f/f    Eye med: Systane OU   Last edited by Tita Dixon MA on 4/4/2023  2:54 PM.            Assessment /Plan     For exam results, see Encounter Report.    Myopia, bilateral      Re-order contacts, try new lab, I will verify lenses when they arrive from lab    Addend 4/18/23 adjusted base curve steeper left eye

## 2023-04-12 ENCOUNTER — PATIENT MESSAGE (OUTPATIENT)
Dept: OPTOMETRY | Facility: CLINIC | Age: 48
End: 2023-04-12
Payer: COMMERCIAL

## 2023-04-26 ENCOUNTER — PATIENT MESSAGE (OUTPATIENT)
Dept: OPTOMETRY | Facility: CLINIC | Age: 48
End: 2023-04-26
Payer: COMMERCIAL

## 2023-05-09 ENCOUNTER — OFFICE VISIT (OUTPATIENT)
Dept: OPTOMETRY | Facility: CLINIC | Age: 48
End: 2023-05-09
Payer: COMMERCIAL

## 2023-05-09 DIAGNOSIS — Z46.0 FITTING AND ADJUSTMENT OF SPECTACLES AND CONTACT LENSES: Primary | ICD-10-CM

## 2023-05-09 PROCEDURE — 99499 UNLISTED E&M SERVICE: CPT | Mod: S$GLB,,, | Performed by: OPTOMETRIST

## 2023-05-09 PROCEDURE — 99499 NO LOS: ICD-10-PCS | Mod: S$GLB,,, | Performed by: OPTOMETRIST

## 2023-05-10 NOTE — PROGRESS NOTES
HPI    46 Y/o female is here for clfu OS pt states that vision is not sharp in   new contact lens   Pt denies pain and discomfort   No f/f    Eye med: Systane OU PRN  Last edited by Diogenes Baires, OD on 5/10/2023  7:41 AM.            Assessment /Plan     For exam results, see Encounter Report.    Fitting and adjustment of spectacles and contact lenses      Increase left eye contact by -.50.

## 2023-05-12 ENCOUNTER — PATIENT MESSAGE (OUTPATIENT)
Dept: OPTOMETRY | Facility: CLINIC | Age: 48
End: 2023-05-12
Payer: COMMERCIAL

## 2023-05-15 ENCOUNTER — PATIENT MESSAGE (OUTPATIENT)
Dept: OPHTHALMOLOGY | Facility: CLINIC | Age: 48
End: 2023-05-15
Payer: COMMERCIAL

## 2023-08-08 NOTE — TELEPHONE ENCOUNTER
Called pt to r/s appt on 12/20 with Dr. Baires   Split-Thickness Skin Graft Text: The defect edges were debeveled with a #15 scalpel blade.  Given the location of the defect, shape of the defect and the proximity to free margins a split thickness skin graft was deemed most appropriate.  Using a sterile surgical marker, the primary defect shape was transferred to the donor site. The split thickness graft was then harvested.  The skin graft was then placed in the primary defect and oriented appropriately.

## 2024-03-13 ENCOUNTER — HOSPITAL ENCOUNTER (OUTPATIENT)
Dept: RADIOLOGY | Facility: HOSPITAL | Age: 49
Discharge: HOME | End: 2024-03-13
Attending: OBSTETRICS & GYNECOLOGY
Payer: COMMERCIAL

## 2024-03-13 VITALS — HEIGHT: 60 IN | BODY MASS INDEX: 23.16 KG/M2 | WEIGHT: 118 LBS

## 2024-03-13 DIAGNOSIS — Z12.31 SCREENING MAMMOGRAM FOR BREAST CANCER: ICD-10-CM

## 2024-03-13 PROCEDURE — 77063 BREAST TOMOSYNTHESIS BI: CPT | Mod: 26,,, | Performed by: RADIOLOGY

## 2024-03-13 PROCEDURE — 77067 SCR MAMMO BI INCL CAD: CPT | Mod: TC

## 2024-03-13 PROCEDURE — 77067 SCR MAMMO BI INCL CAD: CPT | Mod: 26,,, | Performed by: RADIOLOGY

## 2024-06-04 ENCOUNTER — OFFICE VISIT (OUTPATIENT)
Dept: OPTOMETRY | Facility: CLINIC | Age: 49
End: 2024-06-04
Payer: COMMERCIAL

## 2024-06-04 ENCOUNTER — TELEPHONE (OUTPATIENT)
Dept: OPHTHALMOLOGY | Facility: CLINIC | Age: 49
End: 2024-06-04
Payer: COMMERCIAL

## 2024-06-04 DIAGNOSIS — H52.13 MYOPIA, BILATERAL: Primary | ICD-10-CM

## 2024-06-04 DIAGNOSIS — H04.123 BILATERAL DRY EYES: ICD-10-CM

## 2024-06-04 DIAGNOSIS — H44.23 DEGENERATIVE MYOPIA OF BOTH EYES, UNSPECIFIED WHETHER COMPLICATION PRESENT: ICD-10-CM

## 2024-06-04 PROCEDURE — 92310 CONTACT LENS FITTING OU: CPT | Mod: CSM,S$GLB,, | Performed by: OPTOMETRIST

## 2024-06-04 PROCEDURE — 99213 OFFICE O/P EST LOW 20 MIN: CPT | Mod: ,,, | Performed by: OPTOMETRIST

## 2024-06-04 PROCEDURE — 99999 PR PBB SHADOW E&M-EST. PATIENT-LVL II: CPT | Mod: PBBFAC,,, | Performed by: OPTOMETRIST

## 2024-06-04 NOTE — PROGRESS NOTES
TAMARA    IDALIA: 5/9/2023  Chief complaint (CC): patient here for annual / routine check up   States vision has remained stable for the most part,   OS has become a bit worse than last visit   Glasses? + when not wearing contacts   Contacts? +  H/o eye surgery, injections or laser: -  H/o eye injury: -  Known eye conditions? -  Family h/o eye conditions? -  Eye gtts? -      (-) Flashes (-)  Floaters (-) Mucous   (-)  Tearing (-) Itching (-) Burning   (-) Headaches (-) Eye Pain/discomfort (-) Irritation   (-)  Redness (-) Double vision (-) Blurry vision    Diabetic? -  A1c? -      Last edited by Gurmeet Mcguire on 6/4/2024 10:01 AM.            Assessment /Plan     For exam results, see Encounter Report.    Myopia, bilateral    Degenerative myopia of both eyes, unspecified whether complication present    Bilateral dry eyes      Contact lens Rx released to pt. Daily wear only advised, with education to risks of extended wear.  Discussed lens care, compliance and solutions. RTC yearly contact lens follow up.   2. Schedule yearly with Jagjit  3. Cont with contacts, no dryness, ok for rewet drops as needed

## 2024-06-04 NOTE — TELEPHONE ENCOUNTER
----- Message from Diogenes Baires, OD sent at 6/4/2024 10:37 AM CDT -----  Please send pt appt for retina check. Established pt. Prefers [facility]    Thank you

## 2024-06-23 ENCOUNTER — PATIENT MESSAGE (OUTPATIENT)
Dept: OPTOMETRY | Facility: CLINIC | Age: 49
End: 2024-06-23
Payer: COMMERCIAL

## 2025-04-29 ENCOUNTER — TELEPHONE (OUTPATIENT)
Dept: OPHTHALMOLOGY | Facility: CLINIC | Age: 50
End: 2025-04-29
Payer: COMMERCIAL

## 2025-04-29 ENCOUNTER — OFFICE VISIT (OUTPATIENT)
Dept: OPTOMETRY | Facility: CLINIC | Age: 50
End: 2025-04-29
Payer: COMMERCIAL

## 2025-04-29 DIAGNOSIS — H44.23 DEGENERATIVE MYOPIA OF BOTH EYES, UNSPECIFIED WHETHER COMPLICATION PRESENT: ICD-10-CM

## 2025-04-29 DIAGNOSIS — H52.13 MYOPIA, BILATERAL: Primary | ICD-10-CM

## 2025-04-29 DIAGNOSIS — H04.123 BILATERAL DRY EYES: ICD-10-CM

## 2025-04-29 PROCEDURE — 99999 PR PBB SHADOW E&M-EST. PATIENT-LVL II: CPT | Mod: PBBFAC,,, | Performed by: OPTOMETRIST

## 2025-04-29 PROCEDURE — 99213 OFFICE O/P EST LOW 20 MIN: CPT | Mod: S$GLB,,, | Performed by: OPTOMETRIST

## 2025-04-29 PROCEDURE — 92015 DETERMINE REFRACTIVE STATE: CPT | Mod: S$GLB,,, | Performed by: OPTOMETRIST

## 2025-04-29 NOTE — TELEPHONE ENCOUNTER
----- Message from Morgan Kimbrough sent at 4/29/2025 10:57 AM CDT -----  Regarding: Annual [facility] appointment needed  Good Morning,Please contact patient . Jennifer Rivera to schedule a follow up visit with Dr. Danielson per Dr. Baires. She prefers the [facility] location and was last seen in clinic on 02/27/2020.Thanks,Kaylan

## 2025-04-29 NOTE — PROGRESS NOTES
TAMARA    IDALIA: 6/4/2024  Chief complaint (CC): patient here for routine check // clfit   States that vision has remained stable but there has been a slight change   with Rx  Glasses? + when not wearing contacts   Contacts? +  H/o eye surgery, injections or laser: -  H/o eye injury: -  Known eye conditions? -  Family h/o eye conditions? -  Eye gtts? -      (-) Flashes (-)  Floaters (-) Mucous   (-)  Tearing (-) Itching (-) Burning   (-) Headaches (-) Eye Pain/discomfort (-) Irritation   (-)  Redness (-) Double vision (-) Blurry vision    Diabetic? -  A1c? -      Last edited by Gurmeet Mcguire on 4/29/2025 10:12 AM.            Assessment /Plan     For exam results, see Encounter Report.    Myopia, bilateral    Degenerative myopia of both eyes, unspecified whether complication present    Bilateral dry eyes      New Spectacle Rx given,and Contact lens Rx released to pt. Daily wear only advised, with education to risks of extended wear.  Discussed lens care, compliance and solutions. RTC yearly contact lens follow up.  discussed different options for glasses. RTC 1 year routine eye exam.  2. Make appt with Jagjit for retina  3. No current symptoms, ok for artificial tears otc

## 2025-06-12 ENCOUNTER — OFFICE VISIT (OUTPATIENT)
Dept: OPHTHALMOLOGY | Facility: CLINIC | Age: 50
End: 2025-06-12
Payer: COMMERCIAL

## 2025-06-12 DIAGNOSIS — H43.392 VITREOUS FLOATERS OF LEFT EYE: ICD-10-CM

## 2025-06-12 DIAGNOSIS — H35.62 RETINAL HEMORRHAGE, LEFT EYE: Primary | ICD-10-CM

## 2025-06-12 PROCEDURE — 99999 PR PBB SHADOW E&M-EST. PATIENT-LVL III: CPT | Mod: PBBFAC,,, | Performed by: OPHTHALMOLOGY

## 2025-06-12 PROCEDURE — 92014 COMPRE OPH EXAM EST PT 1/>: CPT | Mod: S$GLB,,, | Performed by: OPHTHALMOLOGY

## 2025-06-12 PROCEDURE — 92201 OPSCPY EXTND RTA DRAW UNI/BI: CPT | Mod: S$GLB,,, | Performed by: OPHTHALMOLOGY

## 2025-06-12 PROCEDURE — 92134 CPTRZ OPH DX IMG PST SGM RTA: CPT | Mod: S$GLB,,, | Performed by: OPHTHALMOLOGY

## 2025-06-12 RX ORDER — SPIRONOLACTONE 50 MG/1
TABLET, FILM COATED ORAL
COMMUNITY
Start: 2024-12-18

## 2025-06-12 NOTE — PROGRESS NOTES
HPI     Concerns About Ocular Health     Additional comments: Myopic degeneration ou           Comments    VA stable ou, no pain ou and denies floaters or flashes ou.    No gtts          Last edited by Maryan Cary on 6/12/2025 10:35 AM.            OCT - No ME or heme      A/P    1. Myopic Degeneration   S/p Avastin OS x 1 in 2011    PPheme - resolved      RD precautions      2 yr